# Patient Record
Sex: FEMALE | Race: WHITE | NOT HISPANIC OR LATINO
[De-identification: names, ages, dates, MRNs, and addresses within clinical notes are randomized per-mention and may not be internally consistent; named-entity substitution may affect disease eponyms.]

---

## 2017-09-19 ENCOUNTER — RESULT REVIEW (OUTPATIENT)
Age: 27
End: 2017-09-19

## 2017-10-10 ENCOUNTER — EMERGENCY (EMERGENCY)
Facility: HOSPITAL | Age: 27
LOS: 1 days | Discharge: HOME | End: 2017-10-10
Attending: EMERGENCY MEDICINE | Admitting: EMERGENCY MEDICINE
Payer: COMMERCIAL

## 2017-10-10 VITALS
HEART RATE: 95 BPM | WEIGHT: 128.09 LBS | HEIGHT: 63 IN | TEMPERATURE: 99 F | SYSTOLIC BLOOD PRESSURE: 127 MMHG | RESPIRATION RATE: 16 BRPM | DIASTOLIC BLOOD PRESSURE: 69 MMHG

## 2017-10-10 DIAGNOSIS — R11.0 NAUSEA: ICD-10-CM

## 2017-10-10 PROCEDURE — 99285 EMERGENCY DEPT VISIT HI MDM: CPT

## 2017-10-10 PROCEDURE — 99284 EMERGENCY DEPT VISIT MOD MDM: CPT | Mod: 25

## 2017-10-10 PROCEDURE — 70450 CT HEAD/BRAIN W/O DYE: CPT

## 2017-10-10 PROCEDURE — 70450 CT HEAD/BRAIN W/O DYE: CPT | Mod: 26

## 2017-10-10 NOTE — ED PROVIDER NOTE - PROGRESS NOTE DETAILS
patient resting comfortably, refused pain medication, and nausea medication, advised follow up with her pmd

## 2017-10-10 NOTE — ED ADULT NURSE NOTE - OBJECTIVE STATEMENT
S/P fall bumping forehead into a cubby. Denies LOC but complains of tenderness and pain to the forehead.  Reports nausea and vomiting clear liquid x 1 after injury.  history of anxiety. Assessment done, plan of care discussed with MD and pt. Pending MD evaluation and orders.

## 2017-10-10 NOTE — ED PROVIDER NOTE - OBJECTIVE STATEMENT
26 yo female presents with nausea and mild headache, states had a trip and fall this morning at work, states she is an  , tripped over a child at work today, hit her head against cubby, denies loc,  has mild headache, took ibuprofen after incident,  vomited x 1 after episode.  is refusing nausea medication.  vision is clear, otherwise feels well.  PMD Dr Bailey

## 2017-10-10 NOTE — ED PROVIDER NOTE - CARE PLAN
Principal Discharge DX:	Injury of head, initial encounter  Secondary Diagnosis:	Concussion with no loss of consciousness, initial encounter

## 2017-10-10 NOTE — ED PROVIDER NOTE - ATTENDING CONTRIBUTION TO CARE
I, Dr Gaston, performed the initial face to face bedside interview with this patient regarding history of present illness, review of symptoms and relevant past medical, social and family history.  I completed an independent physical examination.  I was the initial provider who evaluated this patient. I have signed out the follow up of any pending tests (i.e. labs, radiological studies) to the ACP.  I have communicated the patient’s plan of care and disposition with the ACP.

## 2018-12-13 ENCOUNTER — RESULT REVIEW (OUTPATIENT)
Age: 28
End: 2018-12-13

## 2019-12-18 ENCOUNTER — RESULT REVIEW (OUTPATIENT)
Age: 29
End: 2019-12-18

## 2020-12-15 ENCOUNTER — TRANSCRIPTION ENCOUNTER (OUTPATIENT)
Age: 30
End: 2020-12-15

## 2020-12-23 ENCOUNTER — TRANSCRIPTION ENCOUNTER (OUTPATIENT)
Age: 30
End: 2020-12-23

## 2021-04-07 ENCOUNTER — TRANSCRIPTION ENCOUNTER (OUTPATIENT)
Age: 31
End: 2021-04-07

## 2021-07-08 NOTE — ED PROVIDER NOTE - SECONDARY DIAGNOSIS.
"Subjective:       Patient ID: Ifeoma Bernal 
no mammo yet/breast self-exam
Concussion with no loss of consciousness, initial encounter

## 2022-06-28 ENCOUNTER — APPOINTMENT (OUTPATIENT)
Dept: DERMATOLOGY | Facility: CLINIC | Age: 32
End: 2022-06-28

## 2022-06-28 ENCOUNTER — LABORATORY RESULT (OUTPATIENT)
Age: 32
End: 2022-06-28

## 2022-06-28 VITALS — HEIGHT: 62 IN | BODY MASS INDEX: 23.92 KG/M2 | WEIGHT: 130 LBS

## 2022-06-28 DIAGNOSIS — L81.9 DISORDER OF PIGMENTATION, UNSPECIFIED: ICD-10-CM

## 2022-06-28 DIAGNOSIS — L70.9 ACNE, UNSPECIFIED: ICD-10-CM

## 2022-06-28 PROCEDURE — 99204 OFFICE O/P NEW MOD 45 MIN: CPT | Mod: 25

## 2022-06-28 PROCEDURE — 11104 PUNCH BX SKIN SINGLE LESION: CPT

## 2022-06-28 RX ORDER — CLINDAMYCIN PHOSPHATE 10 MG/ML
1 SOLUTION TOPICAL TWICE DAILY
Qty: 1 | Refills: 3 | Status: ACTIVE | COMMUNITY
Start: 2022-06-28 | End: 1900-01-01

## 2022-06-28 RX ORDER — AMMONIUM LACTATE 12 %
12 CREAM (GRAM) TOPICAL
Qty: 1 | Refills: 2 | Status: ACTIVE | COMMUNITY
Start: 2022-06-28 | End: 1900-01-01

## 2022-06-28 RX ORDER — TRETINOIN 0.25 MG/G
0.03 CREAM TOPICAL
Qty: 1 | Refills: 2 | Status: ACTIVE | COMMUNITY
Start: 2022-06-28 | End: 1900-01-01

## 2022-06-28 RX ORDER — BENZOYL PEROXIDE 5 G/100G
5 LIQUID TOPICAL DAILY
Qty: 1 | Refills: 4 | Status: ACTIVE | COMMUNITY
Start: 2022-06-28 | End: 1900-01-01

## 2022-07-12 ENCOUNTER — TRANSCRIPTION ENCOUNTER (OUTPATIENT)
Age: 32
End: 2022-07-12

## 2022-07-12 ENCOUNTER — APPOINTMENT (OUTPATIENT)
Dept: DERMATOLOGY | Facility: CLINIC | Age: 32
End: 2022-07-12

## 2022-07-12 DIAGNOSIS — D48.5 NEOPLASM OF UNCERTAIN BEHAVIOR OF SKIN: ICD-10-CM

## 2022-07-12 PROCEDURE — 99212 OFFICE O/P EST SF 10 MIN: CPT

## 2022-07-21 ENCOUNTER — NON-APPOINTMENT (OUTPATIENT)
Age: 32
End: 2022-07-21

## 2022-07-22 ENCOUNTER — NON-APPOINTMENT (OUTPATIENT)
Age: 32
End: 2022-07-22

## 2022-12-13 ENCOUNTER — NON-APPOINTMENT (OUTPATIENT)
Age: 32
End: 2022-12-13

## 2023-01-16 ENCOUNTER — NON-APPOINTMENT (OUTPATIENT)
Age: 33
End: 2023-01-16

## 2023-05-16 ENCOUNTER — NON-APPOINTMENT (OUTPATIENT)
Age: 33
End: 2023-05-16

## 2023-09-18 ENCOUNTER — APPOINTMENT (OUTPATIENT)
Dept: OBGYN | Facility: CLINIC | Age: 33
End: 2023-09-18
Payer: COMMERCIAL

## 2023-09-18 PROCEDURE — 99395 PREV VISIT EST AGE 18-39: CPT

## 2023-10-23 ENCOUNTER — OUTPATIENT (OUTPATIENT)
Dept: OUTPATIENT SERVICES | Facility: HOSPITAL | Age: 33
LOS: 1 days | Discharge: HOME | End: 2023-10-23

## 2023-10-23 DIAGNOSIS — Z15.09 GENETIC SUSCEPTIBILITY TO OTHER MALIGNANT NEOPLASM: ICD-10-CM

## 2023-10-24 ENCOUNTER — APPOINTMENT (OUTPATIENT)
Dept: HEMATOLOGY ONCOLOGY | Facility: CLINIC | Age: 33
End: 2023-10-24

## 2023-11-07 ENCOUNTER — NON-APPOINTMENT (OUTPATIENT)
Age: 33
End: 2023-11-07

## 2023-12-02 ENCOUNTER — NON-APPOINTMENT (OUTPATIENT)
Age: 33
End: 2023-12-02

## 2023-12-03 ENCOUNTER — NON-APPOINTMENT (OUTPATIENT)
Age: 33
End: 2023-12-03

## 2024-02-02 ENCOUNTER — APPOINTMENT (OUTPATIENT)
Dept: OBGYN | Facility: CLINIC | Age: 34
End: 2024-02-02
Payer: COMMERCIAL

## 2024-02-02 PROCEDURE — 76830 TRANSVAGINAL US NON-OB: CPT

## 2024-02-02 PROCEDURE — 99214 OFFICE O/P EST MOD 30 MIN: CPT | Mod: 25

## 2025-04-29 ENCOUNTER — APPOINTMENT (OUTPATIENT)
Dept: OBGYN | Facility: CLINIC | Age: 35
End: 2025-04-29

## 2025-04-29 PROCEDURE — 99395 PREV VISIT EST AGE 18-39: CPT

## 2025-04-29 PROCEDURE — 99459 PELVIC EXAMINATION: CPT | Mod: NC

## 2025-04-29 PROCEDURE — 96127 BRIEF EMOTIONAL/BEHAV ASSMT: CPT
